# Patient Record
Sex: FEMALE | Race: BLACK OR AFRICAN AMERICAN | NOT HISPANIC OR LATINO | URBAN - NONMETROPOLITAN AREA
[De-identification: names, ages, dates, MRNs, and addresses within clinical notes are randomized per-mention and may not be internally consistent; named-entity substitution may affect disease eponyms.]

---

## 2017-08-03 PROBLEM — E11.3593: Noted: 2017-08-03

## 2017-08-03 PROBLEM — H16.223: Noted: 2019-07-29

## 2017-08-03 PROBLEM — H40.1132: Noted: 2017-08-03

## 2017-08-03 PROBLEM — Z96.1: Noted: 2019-07-29

## 2017-08-03 PROBLEM — H35.341: Noted: 2017-08-03

## 2019-07-29 ENCOUNTER — IMPORTED ENCOUNTER (OUTPATIENT)
Dept: URBAN - NONMETROPOLITAN AREA CLINIC 1 | Facility: CLINIC | Age: 59
End: 2019-07-29

## 2019-07-29 PROCEDURE — 92133 CPTRZD OPH DX IMG PST SGM ON: CPT

## 2019-07-29 PROCEDURE — 92014 COMPRE OPH EXAM EST PT 1/>: CPT

## 2019-07-29 PROCEDURE — 92083 EXTENDED VISUAL FIELD XM: CPT

## 2019-07-29 NOTE — PATIENT DISCUSSION
POAG OU- Discussed diagnosis in detail with patient- Discussed the chronic progressive nature of this disease and various treatment options- VF done today OD UTO  and OS shows Good field with Dense Superior Arcuate and dense Inferior Nasal Step- OCT done today OD UTO and OS shows Inferior NFL thinning and OS shows Borderline Superior Nasal NFL thining - IOP today OD 37 and OS 20- Cup to disc noted at OD total cupping OS . 5- D/C Travatan QHS OS - Start Lumigan QHS OU sample given today- Start Betimol QAM OU sample given today - Continue to monitor- RTC 2-3 weeks pressure checkIDDM (1985)- Discussed diagnosis in detail with patient- Stressed importance of good blood sugar control- Recommend no soda’s- Patient reports last A1C is unknown and last blood sugar was 156- No diabetic retinopathy seen on today's exam- Letter to Dr. Shandra Black- Continue to monitorDES OU- Discussed diagnosis in detail with patient- Discussed signs and symptoms of progression- Recommend patient drinking plenty of water and starting Omega 3’s - Recommend Refresh or Systane  throughout the day- Consider Restasis or plugs in the future if no improvement- Continue to monitor; 's Notes: OCT 7/29/19VF 7/29/19

## 2019-08-13 ENCOUNTER — IMPORTED ENCOUNTER (OUTPATIENT)
Dept: URBAN - NONMETROPOLITAN AREA CLINIC 1 | Facility: CLINIC | Age: 59
End: 2019-08-13

## 2019-08-13 PROCEDURE — 99213 OFFICE O/P EST LOW 20 MIN: CPT

## 2019-08-13 NOTE — PATIENT DISCUSSION
POAG OU moderate stage:  - Discussed diagnosis in detail with patient- Discussed the chronic progressive nature of this disease and various treatment options- VF done previously:  OD UTO  and OS shows Good field with Dense Superior Arcuate and dense Inferior Nasal Step- OCT done previously:  OD UTO and OS shows Inferior NFL thinning and OS shows Borderline Superior Nasal NFL thining - IOP today was 33 OD adn 17 OS only slightly improved from OD 37 and OS 20.- Cup to disc noted at OD total cupping OS . 5- D/C Travatan QHS OS last visit- Continue Lumigan QHS OU sample given last visit- Continue Betimol QAM OU sample given last visit - Patient reports she is currently not in any pain. - Discussed possible referral to Dr. Pa Ocampo but travel distance poses a problem at this time. -  I feel like the A/C IOL OD is causing a blockage recommend seeing Dr. Marcos Gordon for an evaluation for treatment possibilities. Patient agreed to this plan.  - Continue to monitor per Dr. Laquita Wen recommendation(s)IDDM (1985)- Discussed diagnosis in detail with patient- Stressed importance of good blood sugar control- Recommend no soda’s- Patient reports last A1C is unknown and last blood sugar was 156- No diabetic retinopathy seen on today's exam- Letter to Dr. Sneha Nelson- Continue to monitorDES OU- Discussed diagnosis in detail with patient- Discussed signs and symptoms of progression- Recommend patient drinking plenty of water and starting Omega 3’s - Recommend Refresh or Systane  throughout the day- Consider Restasis or plugs in the future if no improvement- Continue to monitorA/C IOL OD and P/C IOL OS-  Discussed findings w/ pt today-  Signs/symptoms associated discussed-  IOLs stable-  Monitor PRN changes; Dr's Notes: OCT 7/29/19VF 7/29/19

## 2019-09-03 ENCOUNTER — IMPORTED ENCOUNTER (OUTPATIENT)
Dept: URBAN - NONMETROPOLITAN AREA CLINIC 1 | Facility: CLINIC | Age: 59
End: 2019-09-03

## 2019-09-03 PROCEDURE — 92014 COMPRE OPH EXAM EST PT 1/>: CPT

## 2019-09-03 NOTE — PATIENT DISCUSSION
POAG OU moderate stage:  - Discussed diagnosis in detail with patient- Discussed the chronic progressive nature of this disease and various treatment options- VF done previously:  OD UTO  and OS shows Good field with Dense Superior Arcuate and dense Inferior Nasal Step- OCT done previously:  OD UTO and OS shows Inferior NFL thinning and OS shows Borderline Superior Nasal NFL thining - IOP lower OD than previously noted but still elevated to 26 today. Borderline IOP OS at 16 today. No pain noted by patient. Recommend adding an additional drop Brimonidine BID OU. Rx sent to pharmacy today. - Cup to disc noted at OD total cupping OS . 5- Continue Lumigan QHS OU sample given last visit- Continue Betimol QAM OU sample given last visit - Patient reports she is currently not in any pain. - Discussed possible referral to Dr. Shasha Ng but travel distance poses a problem at this time. -  A/C IOL does not appear to be causing a blockage. Iridotomy is patent.  Do not recommend removing A/C IOL. - RTC in 1 month for IOP check after adding Brimonidine to drop regimentIDDM (1985)- Discussed diagnosis in detail with patient- Stressed importance of good blood sugar control- Recommend no soda’s- Patient reports last A1C is unknown and last blood sugar was 156- No diabetic retinopathy seen on today's exam- Letter to Dr. Nieto Offer- Continue to monitorDES OU- Discussed diagnosis in detail with patient- Discussed signs and symptoms of progression- Recommend patient drinking plenty of water and starting Omega 3’s - Recommend Refresh or Systane  throughout the day- Consider Restasis or plugs in the future if no improvement- Continue to monitorA/C IOL OD and P/C IOL OS-  Discussed findings w/ pt today-  Signs/symptoms associated discussed-  IOLs stable-  Monitor PRN changes; 's Notes: OCT 7/29/19VF 7/29/19

## 2022-04-10 ASSESSMENT — TONOMETRY
OD_IOP_MMHG: 37
OS_IOP_MMHG: 17
OD_IOP_MMHG: 33
OS_IOP_MMHG: 16
OD_IOP_MMHG: 26
OS_IOP_MMHG: 20

## 2022-04-10 ASSESSMENT — VISUAL ACUITY
OS_CC: 20/25+1
OS_CC: 20/25+
OS_CC: 20/30-